# Patient Record
Sex: FEMALE | Race: WHITE | NOT HISPANIC OR LATINO
[De-identification: names, ages, dates, MRNs, and addresses within clinical notes are randomized per-mention and may not be internally consistent; named-entity substitution may affect disease eponyms.]

---
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
## 2021-06-14 ENCOUNTER — TRANSCRIPTION ENCOUNTER (OUTPATIENT)
Age: 21
End: 2021-06-14

## 2021-10-18 ENCOUNTER — TRANSCRIPTION ENCOUNTER (OUTPATIENT)
Age: 21
End: 2021-10-18

## 2022-08-09 ENCOUNTER — RESULT REVIEW (OUTPATIENT)
Age: 22
End: 2022-08-09

## 2024-03-03 ENCOUNTER — NON-APPOINTMENT (OUTPATIENT)
Age: 24
End: 2024-03-03

## 2024-03-27 ENCOUNTER — APPOINTMENT (OUTPATIENT)
Dept: COLORECTAL SURGERY | Facility: CLINIC | Age: 24
End: 2024-03-27
Payer: COMMERCIAL

## 2024-03-27 VITALS
RESPIRATION RATE: 16 BRPM | DIASTOLIC BLOOD PRESSURE: 72 MMHG | BODY MASS INDEX: 19.7 KG/M2 | TEMPERATURE: 98.9 F | OXYGEN SATURATION: 98 % | HEIGHT: 68 IN | WEIGHT: 130 LBS | SYSTOLIC BLOOD PRESSURE: 121 MMHG | HEART RATE: 77 BPM

## 2024-03-27 DIAGNOSIS — Z78.9 OTHER SPECIFIED HEALTH STATUS: ICD-10-CM

## 2024-03-27 DIAGNOSIS — K59.09 OTHER CONSTIPATION: ICD-10-CM

## 2024-03-27 PROCEDURE — 99204 OFFICE O/P NEW MOD 45 MIN: CPT | Mod: 25

## 2024-03-27 PROCEDURE — 46600 DIAGNOSTIC ANOSCOPY SPX: CPT

## 2024-03-27 RX ORDER — LEVOTHYROXINE SODIUM 0.07 MG/1
75 TABLET ORAL
Refills: 0 | Status: ACTIVE | COMMUNITY

## 2024-03-27 NOTE — CONSULT LETTER
[Dear  ___] : Dear  [unfilled], [Courtesy Letter:] : I had the pleasure of seeing your patient, [unfilled], in my office today. [Please see my note below.] : Please see my note below. [Sincerely,] : Sincerely, [FreeTextEntry3] : Lyle Davison MD, FACS, FASCRS Colorectal Surgery [facility] Assistant Professor of Surgery [employer] at [address], [address] [place] [address] Tel: [phone]  Cell: [phone]  Email: [email or web address]

## 2024-03-27 NOTE — HISTORY OF PRESENT ILLNESS
[FreeTextEntry1] : 23 year old female presents with chronic constipation for several years. She has seen multiple providers including 2 GIs. She has tried OTC stool softeners and laxatives as well as LInzess but they did not provide her with long term relief. She had a sitz marker study in [place] that showed some retained markers but she does not have the report. Manometry was unremarkable according to the patient as well as a normal balloon expulsion. With the use of daily PO magnesium she'll have a BM QOD. She states her BMs are either very loose or small and "pebble-like".  She feels that the stool is in her rectum but she just cannot evacuate it.  No history of colonoscopy. Patient denies family history of colon/rectal cancer, IBD.  No obstetric hx

## 2024-03-27 NOTE — ASSESSMENT
[FreeTextEntry1] : The patient will try to obtain the records from her manometry and sitz marker study I would recommend MR defecography to evaluate for any pelvic floor sources of outlet obstruction She will start a daily fiber supplement to better bulk her stool.

## 2024-03-27 NOTE — PHYSICAL EXAM
[Normal Breath Sounds] : Normal breath sounds [Normal Heart Sounds] : normal heart sounds [Normal Rate and Rhythm] : normal rate and rhythm [No Rash or Lesion] : No rash or lesion [Alert] : alert [Oriented to Person] : oriented to person [Oriented to Place] : oriented to place [Oriented to Time] : oriented to time [Calm] : calm [Excoriation] : no perianal excoriation [Fistula] : no fistulas [Wart] : no warts [Ulcer ___ cm] : no ulcers [Normal] : was normal [None] : there was no rectal mass  [de-identified] : flat, soft, NT/ND, +BS [de-identified] : mildly enlarged right anterior external hemorrhoid [de-identified] : small rectocele, anoscopy reveals no proctitis, small internal hemorrhoids [de-identified] : well nourished female [de-identified] : NC/AT [de-identified] : MANJEET/+ROM [de-identified] : intact

## 2024-03-27 NOTE — REVIEW OF SYSTEMS
[As Noted in HPI] : as noted in HPI [Negative] : Heme/Lymph [FreeTextEntry3] : wears glasses [de-identified] : hypothyroid

## 2024-04-17 ENCOUNTER — APPOINTMENT (OUTPATIENT)
Dept: MRI IMAGING | Facility: IMAGING CENTER | Age: 24
End: 2024-04-17